# Patient Record
Sex: FEMALE | Race: WHITE | NOT HISPANIC OR LATINO | URBAN - NONMETROPOLITAN AREA
[De-identification: names, ages, dates, MRNs, and addresses within clinical notes are randomized per-mention and may not be internally consistent; named-entity substitution may affect disease eponyms.]

---

## 2019-04-08 ENCOUNTER — IMPORTED ENCOUNTER (OUTPATIENT)
Dept: URBAN - NONMETROPOLITAN AREA CLINIC 1 | Facility: CLINIC | Age: 84
End: 2019-04-08

## 2019-04-08 PROBLEM — Z96.1: Noted: 2019-04-08

## 2019-04-08 PROBLEM — H43.813: Noted: 2019-04-08

## 2019-04-08 PROBLEM — H53.2: Noted: 2019-10-14

## 2019-04-08 PROBLEM — H01.021: Noted: 2019-04-08

## 2019-04-08 PROBLEM — H53.2: Noted: 2019-04-08

## 2019-04-08 PROBLEM — H16.223: Noted: 2019-04-08

## 2019-04-08 PROBLEM — H02.831: Noted: 2019-10-14

## 2019-04-08 PROBLEM — H01.024: Noted: 2019-04-08

## 2019-04-08 PROCEDURE — 92015 DETERMINE REFRACTIVE STATE: CPT

## 2019-04-08 PROCEDURE — 99213 OFFICE O/P EST LOW 20 MIN: CPT

## 2019-04-08 NOTE — PATIENT DISCUSSION
PAUL OU/Allergies OU- Discussed diagnosis in detail with patient- Discussed signs and symptoms associated- Recommend drinking plenty of water and taking Omega 3's - Patient has tried OTC eye gtts but does not feel they have helped - Patient has tried Restasis in the past but can not afford - Conitnue Refresh Repair through out the day  - Continue to monitor Pseudophakia OU- Discussed diagnosis in detail with patient- Both intraocular lenses are stable and in place - s/p YAG PC OU- Good central opening - Continue to monitor Blepharitis OU- Discussed diagnosis in detail with patient- Recommend patient using [employer] J baby shampoo to scrub lid daily- Continue to monitorHistory of Intermittant Diplopia- Discussed diagnosis in detail with patient- Discussed signs and symptoms associated- Recommend getting plenty of sleep and drinking plenty of water - Continue to monitor Presbyopia OU- Discussed refractive status in detail with patient- New glasses Rx given previously - Continue to monitor; 's Notes: MR   4/8/19DFE  3/21/2017[facility] 3/3/14

## 2019-10-14 ENCOUNTER — IMPORTED ENCOUNTER (OUTPATIENT)
Dept: URBAN - NONMETROPOLITAN AREA CLINIC 1 | Facility: CLINIC | Age: 84
End: 2019-10-14

## 2019-10-14 PROCEDURE — 92012 INTRM OPH EXAM EST PATIENT: CPT

## 2019-10-14 NOTE — PATIENT DISCUSSION
PAUL OU/Allergies OU/Blepharitis OU- Discussed diagnosis in detail with patient- Discussed signs and symptoms associated- Recommend drinking plenty of water and taking Omega 3's - Patient has tried OTC eye gtts but does not feel they have helped - Patient has tried Restasis in the past but can not afford - Continue Refresh Repair through out the day- Samples of Refresh Gel given today - Recommend J [employer] J baby shampoo to scrub lids daily  - Continue to monitor Pseudophakia OU- Discussed diagnosis in detail with patient- Both intraocular lenses are stable and in place - s/p YAG PC OU- Good central opening - Continue to monitor History of Intermittant Diplopia- Discussed diagnosis in detail with patient- Discussed signs and symptoms associated - Continue to monitor Dermatochalasis OU- Discussed diagnosis in detail with patient- No superior field of vision loss- Continue to monitorPresbyopia OU- Discussed refractive status in detail with patient- New glasses Rx given previously- Continue to monitor; 's Notes: MR   4/8/19DFE  3/21/2017[facility] 3/3/14

## 2022-04-09 ASSESSMENT — TONOMETRY
OS_IOP_MMHG: 14
OD_IOP_MMHG: 12
OD_IOP_MMHG: 14
OS_IOP_MMHG: 15

## 2022-04-09 ASSESSMENT — VISUAL ACUITY
OD_SC: 20/20-
OS_SC: 20/20-
OU_CC: 20/25
OD_SC: 20/20-2
OS_SC: 20/20-